# Patient Record
Sex: MALE | Race: WHITE | NOT HISPANIC OR LATINO | Employment: FULL TIME | URBAN - METROPOLITAN AREA
[De-identification: names, ages, dates, MRNs, and addresses within clinical notes are randomized per-mention and may not be internally consistent; named-entity substitution may affect disease eponyms.]

---

## 2023-08-29 ENCOUNTER — OFFICE VISIT (OUTPATIENT)
Dept: PRIMARY CARE | Facility: CLINIC | Age: 28
End: 2023-08-29
Payer: COMMERCIAL

## 2023-08-29 VITALS
SYSTOLIC BLOOD PRESSURE: 122 MMHG | HEART RATE: 67 BPM | OXYGEN SATURATION: 99 % | HEIGHT: 69 IN | DIASTOLIC BLOOD PRESSURE: 80 MMHG | WEIGHT: 171 LBS | BODY MASS INDEX: 25.33 KG/M2

## 2023-08-29 DIAGNOSIS — K29.70 GASTRITIS WITHOUT BLEEDING, UNSPECIFIED CHRONICITY, UNSPECIFIED GASTRITIS TYPE: ICD-10-CM

## 2023-08-29 DIAGNOSIS — Z00.00 HEALTH CARE MAINTENANCE: Primary | ICD-10-CM

## 2023-08-29 DIAGNOSIS — Z23 NEED FOR TETANUS BOOSTER: ICD-10-CM

## 2023-08-29 DIAGNOSIS — Z23 NEED FOR HPV VACCINATION: ICD-10-CM

## 2023-08-29 PROBLEM — K21.9 GERD (GASTROESOPHAGEAL REFLUX DISEASE): Status: ACTIVE | Noted: 2019-11-07

## 2023-08-29 PROCEDURE — 90472 IMMUNIZATION ADMIN EACH ADD: CPT | Performed by: INTERNAL MEDICINE

## 2023-08-29 PROCEDURE — 90715 TDAP VACCINE 7 YRS/> IM: CPT | Performed by: INTERNAL MEDICINE

## 2023-08-29 PROCEDURE — 1036F TOBACCO NON-USER: CPT | Performed by: INTERNAL MEDICINE

## 2023-08-29 PROCEDURE — 90651 9VHPV VACCINE 2/3 DOSE IM: CPT | Performed by: INTERNAL MEDICINE

## 2023-08-29 PROCEDURE — 99385 PREV VISIT NEW AGE 18-39: CPT | Performed by: INTERNAL MEDICINE

## 2023-08-29 PROCEDURE — 90471 IMMUNIZATION ADMIN: CPT | Performed by: INTERNAL MEDICINE

## 2023-08-29 RX ORDER — ESOMEPRAZOLE MAGNESIUM 40 MG/1
40 CAPSULE, DELAYED RELEASE ORAL
COMMUNITY
Start: 2018-08-01 | End: 2023-08-29 | Stop reason: SDUPTHER

## 2023-08-29 RX ORDER — ESOMEPRAZOLE MAGNESIUM 40 MG/1
40 CAPSULE, DELAYED RELEASE ORAL
Qty: 90 CAPSULE | Refills: 1 | Status: SHIPPED | OUTPATIENT
Start: 2023-08-29 | End: 2023-08-29 | Stop reason: SDUPTHER

## 2023-08-29 RX ORDER — ESOMEPRAZOLE MAGNESIUM 40 MG/1
40 CAPSULE, DELAYED RELEASE ORAL
Qty: 90 CAPSULE | Refills: 1 | Status: SHIPPED | OUTPATIENT
Start: 2023-08-29 | End: 2023-11-30 | Stop reason: SDUPTHER

## 2023-08-29 NOTE — ASSESSMENT & PLAN NOTE
Given durations of gastritis and unremitting without high-dose medication will refer to gastroenterology for evaluation and possible upper endoscopy.  In the meantime continue Nexium  Recommend avoiding spicy foods and carbonated beverages as well as caffeinated beverages.

## 2023-08-29 NOTE — PROGRESS NOTES
"Subjective   Patient ID: Michael Toussaint is a 27 y.o. male who presents for Annual Exam (New patient here for complete physical exam).          HPI     Patient is a 27-year-old male with past medical history of gastritis who presents to [facility] care and for wellness.  Patient recently moved from [facility] to [place].  He is a PhD in chemistry.    He been having gastritis now for greater than 10 years.  He states that when he stops the Nexium for 2 days duration his symptoms recur.  He does drink coffee but not carbonated beverages or spicy foods.  He is never seen a gastroenterologist.  His symptoms are worse in the morning.  The Nexium is effective.  No weight loss or blood in the stool.    Patient is sexually active with one partner.  He is interested in screening for STDs.  No active symptoms.    He drinks alcohol occasionally but denies illicit substances or smoking.    No other complaints at this time.    Review of Systems  Constitutional: No fever or chills, No Night Sweats  Eyes: No Blurry Vision or Eye sight problems  ENT: No Nasal Discharge, Hoarseness, sore throat  Cardiovascular: no chest pain, no palpitations and no syncope.   Respiratory: no cough, no shortness of breath during exertion and no shortness of breath at rest.   Gastrointestinal: no abdominal pain, no nausea and no vomiting.   : No issues with urinary stream, burning with urination, no blood in urine or stools  Skin: No Skin rashes or Lesions  Neuro: No Headache, no dizziness or Numbness or tingling  Psych: No Anxiety, depression or sleeping problems  Heme: No Easy bleeding or brusing.     Objective   /80   Pulse 67   Ht 1.753 m (5' 9\")   Wt 77.6 kg (171 lb)   SpO2 99%   BMI 25.25 kg/m²     Physical Exam  Constitutional: Alert and in no acute distress. Well developed, well nourished.   Head and Face: Head and face: Normal.    Eyes: Normal external exam. Pupils were equal in size, round, reactive to light " "(PERRL) with normal accommodation and extraocular movements intact (EOMI).   Ears, Nose, Mouth, and Throat: External inspection of ears and nose: Normal.  Hearing: Normal.  Nasal mucosa, septum, and turbinates: Normal.  Lips, teeth, and gums: Normal.  Oropharynx: Normal.   Neck: No neck mass was observed. Supple. Thyroid not enlarged and there were no palpable thyroid nodules.   Cardiovascular: Heart rate and rhythm were normal, normal S1 and S2. Pedal pulses: Normal. No peripheral edema.   Pulmonary: No respiratory distress. Clear bilateral breath sounds.   Abdomen: Soft nontender; no abdominal mass palpated. Normal bowel sounds. No organomegaly.   : Deferred  Musculoskeletal: No joint swelling seen, normal movements of all extremities. Range of motion: Normal.  Muscle strength/tone: Normal.    Skin: Normal skin color and pigmentation, normal skin turgor, and no rash.   Neurologic: Deep tendon reflexes were 2+ and symmetric.   Psychiatric: Judgment and insight: Intact. Mood and affect: Normal.  Lymphatic: No cervical lymphadenopathy. Palpation of lymph nodes in axillae: Normal.  Palpation of lymph nodes in groin: Normal.    No results found for: \"WBC\", \"HGB\", \"HCT\", \"PLT\", \"CHOL\", \"TRIG\", \"HDL\", \"LDLDIRECT\", \"ALT\", \"AST\", \"NA\", \"K\", \"CL\", \"CREATININE\", \"BUN\", \"CO2\", \"TSH\", \"PSA\", \"INR\", \"GLUF\", \"HGBA1C\", \"ALBUR\"    No image results found.      Assessment/Plan   Problem List Items Addressed This Visit       Gastritis without bleeding     Given durations of gastritis and unremitting without high-dose medication will refer to gastroenterology for evaluation and possible upper endoscopy.  In the meantime continue Nexium  Recommend avoiding spicy foods and carbonated beverages as well as caffeinated beverages.         Relevant Medications    esomeprazole (NexIUM) 40 mg DR capsule    Other Relevant Orders    Referral to Gastroenterology    Health care maintenance - Primary    Relevant Orders    CBC    Comprehensive " metabolic panel    Lipid Panel    HIV 1/2 Antigen/Antibody Screen with Reflex to Confirmation    Hepatitis B surface antibody    Hepatitis C Antibody    C. Trachomatis / N. Gonorrhoeae, Amplified Detection    Syphilis Screen with Reflex     Other Visit Diagnoses       Need for tetanus booster        Relevant Orders    Tdap vaccine, age 10 years and older (BOOSTRIX)    Need for HPV vaccination        Relevant Orders    HPV 9-valent vaccine (GARDASIL 9)              Dear Michael Toussaint     It was my pleasure to take care of you today in the office. Below are the things we discussed today:    1. Immunizations: Yearly Flu shot is recommended.          a: COVID: Up-to-date         b: Tetanus: Given today         c: Gardasil; start series today.  Repeat in 1 to 2 months and then in 6 months    2. Blood Work:  3. Seen your dentist twice a year  4. Yearly Eye exam is recommended    5. BMI: 25.3  6: Diet recommendations:   Eat Clean, Try to have as many home cooked meals as possible  Avoid processed foods which contain excess calories, sugar, and sodium.    7. Exercise recommendations:   150 minutes a week to maintain your weight     If you have to loose weight, you need a better diet and exercise plan.     8. Please get your Living will / Advance directive completed if you do not have one already. Please make sure our office has a copy of the latest one.     9. Colonoscopy: Started age 45  10. PSA: Started age 50    11.     Follow up in one year for a Physical. Please call the office before your Physical to see if you need blood work completed prior to your physical.     Please call me if any questions arise from now until your next visit. I will call you after I am done seeing patients. A Doctor is always available by phone when the office is closed. Please feel free to call for help with any problem that you feel shouldn't wait until the office re-opens.     Todd Mason, DO

## 2023-08-30 LAB
ALANINE AMINOTRANSFERASE (SGPT) (U/L) IN SER/PLAS: 36 U/L (ref 10–52)
ALBUMIN (G/DL) IN SER/PLAS: 4.6 G/DL (ref 3.4–5)
ALKALINE PHOSPHATASE (U/L) IN SER/PLAS: 59 U/L (ref 33–120)
ANION GAP IN SER/PLAS: 12 MMOL/L (ref 10–20)
ASPARTATE AMINOTRANSFERASE (SGOT) (U/L) IN SER/PLAS: 24 U/L (ref 9–39)
BILIRUBIN TOTAL (MG/DL) IN SER/PLAS: 0.6 MG/DL (ref 0–1.2)
CALCIUM (MG/DL) IN SER/PLAS: 10.1 MG/DL (ref 8.6–10.6)
CARBON DIOXIDE, TOTAL (MMOL/L) IN SER/PLAS: 30 MMOL/L (ref 21–32)
CHLORIDE (MMOL/L) IN SER/PLAS: 101 MMOL/L (ref 98–107)
CHOLESTEROL (MG/DL) IN SER/PLAS: 165 MG/DL (ref 0–199)
CHOLESTEROL IN HDL (MG/DL) IN SER/PLAS: 38.4 MG/DL
CHOLESTEROL/HDL RATIO: 4.3
CREATININE (MG/DL) IN SER/PLAS: 1.03 MG/DL (ref 0.5–1.3)
ERYTHROCYTE DISTRIBUTION WIDTH (RATIO) BY AUTOMATED COUNT: 12.2 % (ref 11.5–14.5)
ERYTHROCYTE MEAN CORPUSCULAR HEMOGLOBIN CONCENTRATION (G/DL) BY AUTOMATED: 32.6 G/DL (ref 32–36)
ERYTHROCYTE MEAN CORPUSCULAR VOLUME (FL) BY AUTOMATED COUNT: 89 FL (ref 80–100)
ERYTHROCYTES (10*6/UL) IN BLOOD BY AUTOMATED COUNT: 5.06 X10E12/L (ref 4.5–5.9)
GFR MALE: >90 ML/MIN/1.73M2
GLUCOSE (MG/DL) IN SER/PLAS: 91 MG/DL (ref 74–99)
HEMATOCRIT (%) IN BLOOD BY AUTOMATED COUNT: 45.1 % (ref 41–52)
HEMOGLOBIN (G/DL) IN BLOOD: 14.7 G/DL (ref 13.5–17.5)
HEPATITIS B VIRUS SURFACE AB (MIU/ML) IN SERUM: 5.8 MIU/ML
HEPATITIS C VIRUS AB PRESENCE IN SERUM: NONREACTIVE
HIV 1/ 2 AG/AB SCREEN: NONREACTIVE
LDL: 116 MG/DL (ref 0–99)
LEUKOCYTES (10*3/UL) IN BLOOD BY AUTOMATED COUNT: 6.5 X10E9/L (ref 4.4–11.3)
NRBC (PER 100 WBCS) BY AUTOMATED COUNT: 0 /100 WBC (ref 0–0)
PLATELETS (10*3/UL) IN BLOOD AUTOMATED COUNT: 237 X10E9/L (ref 150–450)
POTASSIUM (MMOL/L) IN SER/PLAS: 4.3 MMOL/L (ref 3.5–5.3)
PROTEIN TOTAL: 7.2 G/DL (ref 6.4–8.2)
SODIUM (MMOL/L) IN SER/PLAS: 139 MMOL/L (ref 136–145)
SYPHILIS TOTAL AB: NONREACTIVE
TRIGLYCERIDE (MG/DL) IN SER/PLAS: 54 MG/DL (ref 0–149)
UREA NITROGEN (MG/DL) IN SER/PLAS: 13 MG/DL (ref 6–23)
VLDL: 11 MG/DL (ref 0–40)

## 2023-08-30 PROCEDURE — 87389 HIV-1 AG W/HIV-1&-2 AB AG IA: CPT

## 2023-08-30 PROCEDURE — 87591 N.GONORRHOEAE DNA AMP PROB: CPT

## 2023-08-30 PROCEDURE — 87491 CHLMYD TRACH DNA AMP PROBE: CPT

## 2023-08-30 PROCEDURE — 80053 COMPREHEN METABOLIC PANEL: CPT

## 2023-08-30 PROCEDURE — 86706 HEP B SURFACE ANTIBODY: CPT

## 2023-08-30 PROCEDURE — 85027 COMPLETE CBC AUTOMATED: CPT

## 2023-08-30 PROCEDURE — 86803 HEPATITIS C AB TEST: CPT

## 2023-08-30 PROCEDURE — 86780 TREPONEMA PALLIDUM: CPT

## 2023-08-30 PROCEDURE — 80061 LIPID PANEL: CPT

## 2023-08-31 LAB
CHLAMYDIA TRACH., AMPLIFIED: NEGATIVE
N. GONORRHEA, AMPLIFIED: NEGATIVE

## 2023-11-29 NOTE — PROGRESS NOTES
PCP: No primary care provider on file.    Referring provider: No ref. provider found    Michael Toussaint is a 28 y.o. male with PMH of GERD presenting for evaluation.  History Of Present Illness:  Patient is feeling well today. He states that he was diagnosed with GERD at age 17. He had a normal EGD in 2013, but he is unsure if biopsies were performed. His symptoms are mostly controlled on esomeprazole, which he has taken consistently for the past 5 years. He states that if he misses a dose, his symptoms recur almost immediately. He states that symptoms are exacerbated by large carbohydrate loads, such as pancakes and pasta. His symptoms are also exacerbated by eating late at night. He does endorse globus sensation, but denies aspiration or choking. He denies personal or family history of allergic or atopic diseases. He denies NSAID use.    He additionally denies syncope/LOC, chest pain, dyspnea, cough, abdominal pain, dysphagia, nausea, vomiting, constipation, melena, hematochezia, dysuria, hematuria, lower extremity swelling, rash, fevers, chills, sick contacts, weight loss, or travel.     12-point ROS was reviewed and is otherwise negative.    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    Home Medications:  Current Outpatient Medications   Medication Instructions    esomeprazole (NEXIUM) 40 mg, oral, Daily before breakfast        Surgical History:  He has no past surgical history on file.     Social History:  He reports that he has never smoked. He has never used smokeless tobacco. He reports current alcohol use of about 2.0 standard drinks of alcohol per week. He reports that he does not use drugs.    Family History:  No family history on file.    "  Allergies:  Sulfa (sulfonamide antibiotics) and Amoxicillin    OBJECTIVE:  Vital Signs:  Blood pressure 128/72, pulse 77, temperature 36.6 °C (97.8 °F), resp. rate 17, height 1.753 m (5' 9\"), weight 75.3 kg (165 lb 14.4 oz), SpO2 97 %.    Physical Exam:  General: Patient is in NAD.  Neuro: A and O x 3, CN 1-12 grossly intact, no asterixis.  HEENT: moist mucosa, sclera anicteric, EOMI  CV: regular rate  Pulm: nonlabored breathing on room air  Abd: Soft, nontender/ nondistended, +BS, no rebound or guarding, no hepatosplenomegaly.  Ext: Warm and well-perfused.  Psych: Appropriate mood and behavior.    Labs:  Lab Results   Component Value Date    WBC 6.5 08/30/2023    HGB 14.7 08/30/2023    HCT 45.1 08/30/2023    MCV 89 08/30/2023     08/30/2023     Lab Results   Component Value Date    GLUCOSE 91 08/30/2023    CALCIUM 10.1 08/30/2023     08/30/2023    K 4.3 08/30/2023    CO2 30 08/30/2023     08/30/2023    BUN 13 08/30/2023    CREATININE 1.03 08/30/2023     Lab Results   Component Value Date    ALT 36 08/30/2023    AST 24 08/30/2023    ALKPHOS 59 08/30/2023    BILITOT 0.6 08/30/2023       Assessment/Plan   Patient is a 27 yo M with longstanding GERD who presents for evaluation. Symptoms are generally controlled on PPI, but patient does have intermittent breakthrough. He does not recall having biopsies with prior endoscopic evaluation. Ddx is GERD v EoE. Celiac and HP are possible but less likely.    Plan:  -EGD ordered today  -Continue PPI 40mg once daily. If symptoms worsen, can increase to twice daily.    RTC in 6 months, pending endoscopy results .    Case seen, examined and discussed with attending, Dr. Grady.   "

## 2023-11-30 ENCOUNTER — OFFICE VISIT (OUTPATIENT)
Dept: GASTROENTEROLOGY | Facility: HOSPITAL | Age: 28
End: 2023-11-30
Payer: COMMERCIAL

## 2023-11-30 VITALS
DIASTOLIC BLOOD PRESSURE: 72 MMHG | SYSTOLIC BLOOD PRESSURE: 128 MMHG | RESPIRATION RATE: 17 BRPM | HEIGHT: 69 IN | TEMPERATURE: 97.8 F | WEIGHT: 165.9 LBS | OXYGEN SATURATION: 97 % | BODY MASS INDEX: 24.57 KG/M2 | HEART RATE: 77 BPM

## 2023-11-30 DIAGNOSIS — K29.70 GASTRITIS WITHOUT BLEEDING, UNSPECIFIED CHRONICITY, UNSPECIFIED GASTRITIS TYPE: Primary | ICD-10-CM

## 2023-11-30 PROCEDURE — 99214 OFFICE O/P EST MOD 30 MIN: CPT | Mod: GC | Performed by: STUDENT IN AN ORGANIZED HEALTH CARE EDUCATION/TRAINING PROGRAM

## 2023-11-30 PROCEDURE — 99204 OFFICE O/P NEW MOD 45 MIN: CPT | Performed by: STUDENT IN AN ORGANIZED HEALTH CARE EDUCATION/TRAINING PROGRAM

## 2023-11-30 PROCEDURE — 1036F TOBACCO NON-USER: CPT | Performed by: STUDENT IN AN ORGANIZED HEALTH CARE EDUCATION/TRAINING PROGRAM

## 2023-11-30 RX ORDER — ESOMEPRAZOLE MAGNESIUM 40 MG/1
40 CAPSULE, DELAYED RELEASE ORAL
Qty: 90 CAPSULE | Refills: 3 | Status: SHIPPED | OUTPATIENT
Start: 2023-11-30 | End: 2024-03-13

## 2023-11-30 SDOH — ECONOMIC STABILITY: FOOD INSECURITY: WITHIN THE PAST 12 MONTHS, THE FOOD YOU BOUGHT JUST DIDN'T LAST AND YOU DIDN'T HAVE MONEY TO GET MORE.: NEVER TRUE

## 2023-11-30 SDOH — ECONOMIC STABILITY: FOOD INSECURITY: WITHIN THE PAST 12 MONTHS, YOU WORRIED THAT YOUR FOOD WOULD RUN OUT BEFORE YOU GOT MONEY TO BUY MORE.: NEVER TRUE

## 2023-11-30 ASSESSMENT — PATIENT HEALTH QUESTIONNAIRE - PHQ9
2. FEELING DOWN, DEPRESSED OR HOPELESS: NOT AT ALL
SUM OF ALL RESPONSES TO PHQ9 QUESTIONS 1 AND 2: 0
1. LITTLE INTEREST OR PLEASURE IN DOING THINGS: NOT AT ALL

## 2023-11-30 ASSESSMENT — LIFESTYLE VARIABLES
HOW OFTEN DO YOU HAVE SIX OR MORE DRINKS ON ONE OCCASION: NEVER
HOW MANY STANDARD DRINKS CONTAINING ALCOHOL DO YOU HAVE ON A TYPICAL DAY: 1 OR 2
HOW OFTEN DO YOU HAVE A DRINK CONTAINING ALCOHOL: 2-4 TIMES A MONTH
SKIP TO QUESTIONS 9-10: 1
AUDIT-C TOTAL SCORE: 2

## 2023-11-30 ASSESSMENT — PAIN SCALES - GENERAL: PAINLEVEL: 0-NO PAIN

## 2023-11-30 NOTE — PATIENT INSTRUCTIONS
Dear  Breana    It was a pleasure meeting you in clinic today.    Schedule your endoscopy either at main campus or at [facility] (you will need a  for the procedure).  Before the procedure, you will need labs.  I refilled your nexium.    Based on the results of your endoscopy, we can decide if you should follow up in clinic.    Best regards,    Fidelia Caraballo MD  Division of Gastroenterology and Liver Disease  [address] [place] [address] [address] [place] [id number]  Appt. Phone   Inquiries   Fax  or

## 2023-11-30 NOTE — LETTER
December 11, 2023     Todd Mason DO  [address]  [address]  [facility] [address]    Patient: Michael Toussaint   YOB: 1995   Date of Visit: 11/30/2023       Dear Dr. Todd Mason DO:    Thank you for referring Michael Toussaint to me for evaluation. Below are my notes for this consultation.  If you have questions, please do not hesitate to call me. I look forward to following your patient along with you.       Sincerely,     Fidelia Caraballo MD      CC: No Recipients  ______________________________________________________________________________________    PCP: No primary care provider on file.    Referring provider: No ref. provider found    Michael Toussaint is a 28 y.o. male with PMH of GERD presenting for evaluation.  History Of Present Illness:  Patient is feeling well today. He states that he was diagnosed with GERD at age 17. He had a normal EGD in 2013, but he is unsure if biopsies were performed. His symptoms are mostly controlled on esomeprazole, which he has taken consistently for the past 5 years. He states that if he misses a dose, his symptoms recur almost immediately. He states that symptoms are exacerbated by large carbohydrate loads, such as pancakes and pasta. His symptoms are also exacerbated by eating late at night. He does endorse globus sensation, but denies aspiration or choking. He denies personal or family history of allergic or atopic diseases. He denies NSAID use.    He additionally denies syncope/LOC, chest pain, dyspnea, cough, abdominal pain, dysphagia, nausea, vomiting, constipation, melena, hematochezia, dysuria, hematuria, lower extremity swelling, rash, fevers, chills, sick contacts, weight loss, or travel.     12-point ROS was reviewed and is otherwise negative.    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    Home Medications:  Current Outpatient Medications   Medication Instructions   • esomeprazole (NEXIUM) 40 mg, oral, Daily before breakfast        Surgical History:  He has no past surgical history on file.     Social History:  He reports that he has never smoked. He has never used smokeless tobacco. He reports current alcohol use of about 2.0 standard drinks of alcohol per week. He reports that he does not use drugs.    Family History:  No family history on file.     Allergies:  Sulfa (sulfonamide antibiotics) and Amoxicillin    OBJECTIVE:  Vital Signs:  Blood pressure 128/72, pulse 77, temperature 36.6 °C (97.8 °F), resp. rate 17, height 1.753 m (5' 9\"), weight 75.3 kg (165 lb 14.4 oz), SpO2 97 %.    Physical Exam:  General: Patient is in NAD.  Neuro: A and O x 3, CN 1-12 grossly intact, no asterixis.  HEENT: moist mucosa, sclera anicteric, EOMI  CV: regular rate  Pulm: nonlabored breathing on room air  Abd: Soft, nontender/ nondistended, +BS, no rebound or guarding, no hepatosplenomegaly.  Ext: Warm and well-perfused.  Psych: Appropriate mood and behavior.    Labs:  Lab Results   Component Value Date    WBC 6.5 08/30/2023    HGB 14.7 08/30/2023    HCT 45.1 08/30/2023    MCV 89 08/30/2023     08/30/2023     Lab Results   Component Value Date    GLUCOSE 91 08/30/2023    CALCIUM 10.1 08/30/2023     08/30/2023    K 4.3 08/30/2023    CO2 30 08/30/2023     08/30/2023    BUN 13 08/30/2023    CREATININE 1.03 08/30/2023     Lab Results   Component Value Date    ALT 36 08/30/2023    AST 24 08/30/2023    ALKPHOS 59 08/30/2023    BILITOT 0.6 08/30/2023       Assessment/Plan  Patient is a 29 yo M with longstanding GERD who presents for evaluation. Symptoms are generally controlled on PPI, but patient does have intermittent breakthrough. He does not recall having biopsies with prior " endoscopic evaluation. Ddx is GERD v EoE. Celiac and HP are possible but less likely.    Plan:  -EGD ordered today  -Continue PPI 40mg once daily. If symptoms worsen, can increase to twice daily.    RTC in 6 months, pending endoscopy results .    Case seen, examined and discussed with attending, Dr. Grady.     Attestation signed by James Grady MD PhD at 11/30/2023  3:27 PM:  I saw and evaluated the patient. I personally obtained the key and critical portions of the history and physical exam or was physically present for key and critical portions performed by the trainee. I agree with the trainee's medical decision making as described in the trainee's note.    James Grady MD

## 2024-01-11 NOTE — PROGRESS NOTES
I saw and evaluated the patient. I personally obtained the key and critical portions of the history and physical exam or was physically present for key and critical portions performed by the trainee. I agree with the trainee's medical decision making as described in the trainee's note.    MD James Dejesus MD PhD

## 2024-02-01 ENCOUNTER — ANESTHESIA EVENT (OUTPATIENT)
Dept: GASTROENTEROLOGY | Facility: HOSPITAL | Age: 29
End: 2024-02-01
Payer: COMMERCIAL

## 2024-02-01 NOTE — ANESTHESIA PREPROCEDURE EVALUATION
"Patient: Michael Toussaint    Procedure Information       Date/Time: 02/02/24 0730    Scheduled providers: Tessie Santos MD    Procedure: EGD    [place] [facility] [place]            Relevant Problems   GI   (+) GERD (gastroesophageal reflux disease)       Clinical information reviewed:   Tobacco  Allergies  Meds   Med Hx  Surg Hx   Fam Hx  Soc Hx        NPO/Void Status  Date of Last Liquid: 02/02/24  Time of Last Liquid: 0000  Date of Last Solid: 02/01/24  Time of Last Solid: 2100           Past Medical History:   Diagnosis Date    GERD (gastroesophageal reflux disease)       Past Surgical History:   Procedure Laterality Date    ESOPHAGOGASTRODUODENOSCOPY      WISDOM TOOTH EXTRACTION       Social History     Tobacco Use    Smoking status: Never    Smokeless tobacco: Never   Vaping Use    Vaping Use: Never used   Substance Use Topics    Alcohol use: Yes     Alcohol/week: 2.0 standard drinks of alcohol     Types: 2 Cans of beer per week    Drug use: Never      Current Outpatient Medications   Medication Instructions    esomeprazole (NEXIUM) 40 mg, oral, Daily before breakfast      Allergies   Allergen Reactions    Sulfa (Sulfonamide Antibiotics) Other    Amoxicillin Itching and Rash        Chemistry    Lab Results   Component Value Date/Time     08/30/2023 0902    K 4.3 08/30/2023 0902     08/30/2023 0902    CO2 30 08/30/2023 0902    BUN 13 08/30/2023 0902    CREATININE 1.03 08/30/2023 0902    Lab Results   Component Value Date/Time    CALCIUM 10.1 08/30/2023 0902    ALKPHOS 59 08/30/2023 0902    AST 24 08/30/2023 0902    ALT 36 08/30/2023 0902    BILITOT 0.6 08/30/2023 0902          Lab Results   Component Value Date/Time    WBC 6.5 08/30/2023 0902    HGB 14.7 08/30/2023 0902    HCT 45.1 08/30/2023 0902     08/30/2023 0902     No results found for: \"PROTIME\", \"PTT\", \"INR\"  No results found for this or any previous visit (from the past 4464 hour(s)).  No results found for this " "or any previous visit from the past 1095 days.       Visit Vitals  /82   Pulse 62   Temp 36.2 °C (97.2 °F) (Temporal)   Resp 16   Ht 1.727 m (5' 8\")   Wt 77 kg (169 lb 12.1 oz)   SpO2 99%   BMI 25.81 kg/m²   Smoking Status Never   BSA 1.92 m²        Physical Exam    Airway  Mallampati: III  TM distance: >3 FB  Neck ROM: full     Cardiovascular   Rhythm: regular  Rate: normal     Dental - normal exam     Pulmonary   Breath sounds clear to auscultation     Abdominal - normal exam              Anesthesia Plan    History of general anesthesia?: yes  History of complications of general anesthesia?: no    ASA 2     MAC     intravenous induction   Anesthetic plan and risks discussed with patient.    Plan discussed with CRNA and CAA.        "

## 2024-02-02 ENCOUNTER — ANESTHESIA (OUTPATIENT)
Dept: GASTROENTEROLOGY | Facility: HOSPITAL | Age: 29
End: 2024-02-02
Payer: COMMERCIAL

## 2024-02-02 ENCOUNTER — HOSPITAL ENCOUNTER (OUTPATIENT)
Dept: GASTROENTEROLOGY | Facility: HOSPITAL | Age: 29
Setting detail: OUTPATIENT SURGERY
Discharge: HOME | End: 2024-02-02
Payer: COMMERCIAL

## 2024-02-02 VITALS
DIASTOLIC BLOOD PRESSURE: 78 MMHG | SYSTOLIC BLOOD PRESSURE: 131 MMHG | OXYGEN SATURATION: 99 % | HEART RATE: 80 BPM | TEMPERATURE: 99.3 F | RESPIRATION RATE: 18 BRPM | BODY MASS INDEX: 25.73 KG/M2 | WEIGHT: 169.75 LBS | HEIGHT: 68 IN

## 2024-02-02 DIAGNOSIS — K29.70 GASTRITIS WITHOUT BLEEDING, UNSPECIFIED CHRONICITY, UNSPECIFIED GASTRITIS TYPE: ICD-10-CM

## 2024-02-02 PROCEDURE — 43235 EGD DIAGNOSTIC BRUSH WASH: CPT | Performed by: INTERNAL MEDICINE

## 2024-02-02 PROCEDURE — 7100000009 HC PHASE TWO TIME - INITIAL BASE CHARGE

## 2024-02-02 PROCEDURE — 7100000010 HC PHASE TWO TIME - EACH INCREMENTAL 1 MINUTE

## 2024-02-02 PROCEDURE — A43239 PR EDG TRANSORAL BIOPSY SINGLE/MULTIPLE: Performed by: NURSE ANESTHETIST, CERTIFIED REGISTERED

## 2024-02-02 PROCEDURE — A43239 PR EDG TRANSORAL BIOPSY SINGLE/MULTIPLE: Performed by: ANESTHESIOLOGY

## 2024-02-02 PROCEDURE — 3700000002 HC GENERAL ANESTHESIA TIME - EACH INCREMENTAL 1 MINUTE

## 2024-02-02 PROCEDURE — 3700000001 HC GENERAL ANESTHESIA TIME - INITIAL BASE CHARGE

## 2024-02-02 PROCEDURE — 2500000004 HC RX 250 GENERAL PHARMACY W/ HCPCS (ALT 636 FOR OP/ED): Performed by: NURSE ANESTHETIST, CERTIFIED REGISTERED

## 2024-02-02 RX ORDER — SODIUM CHLORIDE, SODIUM LACTATE, POTASSIUM CHLORIDE, CALCIUM CHLORIDE 600; 310; 30; 20 MG/100ML; MG/100ML; MG/100ML; MG/100ML
INJECTION, SOLUTION INTRAVENOUS CONTINUOUS PRN
Status: DISCONTINUED | OUTPATIENT
Start: 2024-02-02 | End: 2024-02-02

## 2024-02-02 RX ORDER — PROPOFOL 10 MG/ML
INJECTION, EMULSION INTRAVENOUS AS NEEDED
Status: DISCONTINUED | OUTPATIENT
Start: 2024-02-02 | End: 2024-02-02

## 2024-02-02 RX ORDER — MIDAZOLAM HYDROCHLORIDE 1 MG/ML
INJECTION INTRAMUSCULAR; INTRAVENOUS AS NEEDED
Status: DISCONTINUED | OUTPATIENT
Start: 2024-02-02 | End: 2024-02-02

## 2024-02-02 RX ADMIN — MIDAZOLAM HYDROCHLORIDE 2 MG: 1 INJECTION INTRAMUSCULAR; INTRAVENOUS at 07:44

## 2024-02-02 RX ADMIN — PROPOFOL 50 MG: 10 INJECTION, EMULSION INTRAVENOUS at 07:46

## 2024-02-02 RX ADMIN — PROPOFOL 50 MG: 10 INJECTION, EMULSION INTRAVENOUS at 07:45

## 2024-02-02 RX ADMIN — PROPOFOL 20 MG: 10 INJECTION, EMULSION INTRAVENOUS at 07:44

## 2024-02-02 RX ADMIN — SODIUM CHLORIDE, SODIUM LACTATE, POTASSIUM CHLORIDE, AND CALCIUM CHLORIDE: .6; .31; .03; .02 INJECTION, SOLUTION INTRAVENOUS at 07:33

## 2024-02-02 RX ADMIN — PROPOFOL 30 MG: 10 INJECTION, EMULSION INTRAVENOUS at 07:47

## 2024-02-02 ASSESSMENT — PAIN SCALES - GENERAL
PAINLEVEL_OUTOF10: 0 - NO PAIN

## 2024-02-02 ASSESSMENT — COLUMBIA-SUICIDE SEVERITY RATING SCALE - C-SSRS
6. HAVE YOU EVER DONE ANYTHING, STARTED TO DO ANYTHING, OR PREPARED TO DO ANYTHING TO END YOUR LIFE?: NO
2. HAVE YOU ACTUALLY HAD ANY THOUGHTS OF KILLING YOURSELF?: NO
1. IN THE PAST MONTH, HAVE YOU WISHED YOU WERE DEAD OR WISHED YOU COULD GO TO SLEEP AND NOT WAKE UP?: NO

## 2024-02-02 ASSESSMENT — PAIN - FUNCTIONAL ASSESSMENT
PAIN_FUNCTIONAL_ASSESSMENT: 0-10

## 2024-02-02 NOTE — SIGNIFICANT EVENT
Assumed care of this pt at this time.  Pt is awake, eating and drinking without nausea or vomiting

## 2024-02-02 NOTE — H&P
"History Of Present Illness  Michael Toussaint is a 28 y.o. male presenting with gerd.     Past Medical History  Past Medical History:   Diagnosis Date    GERD (gastroesophageal reflux disease)      Surgical History  Past Surgical History:   Procedure Laterality Date    ESOPHAGOGASTRODUODENOSCOPY      WISDOM TOOTH EXTRACTION       Social History  He reports that he has never smoked. He has never used smokeless tobacco. He reports current alcohol use of about 2.0 standard drinks of alcohol per week. He reports that he does not use drugs.    Family History  No family history on file.     Allergies  Allergies   Allergen Reactions    Sulfa (Sulfonamide Antibiotics) Other    Amoxicillin Itching and Rash     Review of Systems     Physical Exam     Last Recorded Vitals  Blood pressure 128/82, pulse 62, temperature 36.2 °C (97.2 °F), temperature source Temporal, resp. rate 16, height 1.727 m (5' 8\"), weight 77 kg (169 lb 12.1 oz), SpO2 99 %.    Assessment/Plan   egd     Tessie Santos MD  "

## 2024-02-02 NOTE — ANESTHESIA POSTPROCEDURE EVALUATION
Patient: Michael Toussaint    Procedure Summary       Date: 02/02/24 Room / Location: [facility]    Anesthesia Start: 0740 Anesthesia Stop: 0753    Procedure: EGD Diagnosis: Gastritis without bleeding, unspecified chronicity, unspecified gastritis type    Scheduled Providers: Tessie Santos MD; Fuentes Seymour MD; JESUS Casillas; Eboni Lundberg, SYDNEY; Marcell Holley, RN; Ashlyn Matos RN Responsible Provider: Fuentes Seymour MD    Anesthesia Type: MAC ASA Status: 2            Anesthesia Type: MAC    Vitals Value Taken Time   /78 02/02/24 0830   Temp 37.4 °C (99.3 °F) 02/02/24 0755   Pulse 80 02/02/24 0830   Resp 18 02/02/24 0830   SpO2 99 % 02/02/24 0830       Anesthesia Post Evaluation    Patient location during evaluation: PACU  Patient participation: complete - patient participated  Level of consciousness: awake and alert  Pain management: adequate  Airway patency: patent  Cardiovascular status: acceptable and hemodynamically stable  Respiratory status: acceptable, spontaneous ventilation and nonlabored ventilation  Hydration status: acceptable  Postoperative Nausea and Vomiting: none        There were no known notable events for this encounter.

## 2024-02-05 ASSESSMENT — PAIN SCALES - GENERAL: PAINLEVEL_OUTOF10: 0 - NO PAIN

## 2024-02-05 NOTE — ADDENDUM NOTE
Encounter addended by: Michelle Lama RN on: 2/5/2024 8:38 AM   Actions taken: Contacts section saved, Flowsheet accepted

## 2024-03-13 RX ORDER — ESOMEPRAZOLE MAGNESIUM 40 MG/1
40 CAPSULE, DELAYED RELEASE ORAL
Qty: 30 CAPSULE | Refills: 11 | Status: SHIPPED | OUTPATIENT
Start: 2024-03-13 | End: 2025-03-13

## 2024-03-18 ENCOUNTER — OFFICE VISIT (OUTPATIENT)
Dept: PRIMARY CARE | Facility: CLINIC | Age: 29
End: 2024-03-18
Payer: COMMERCIAL

## 2024-03-18 ENCOUNTER — LAB (OUTPATIENT)
Dept: LAB | Facility: LAB | Age: 29
End: 2024-03-18
Payer: COMMERCIAL

## 2024-03-18 VITALS
OXYGEN SATURATION: 99 % | DIASTOLIC BLOOD PRESSURE: 74 MMHG | HEART RATE: 64 BPM | BODY MASS INDEX: 26 KG/M2 | WEIGHT: 171 LBS | SYSTOLIC BLOOD PRESSURE: 118 MMHG

## 2024-03-18 DIAGNOSIS — Z11.3 SCREEN FOR STD (SEXUALLY TRANSMITTED DISEASE): ICD-10-CM

## 2024-03-18 DIAGNOSIS — Z11.3 SCREEN FOR STD (SEXUALLY TRANSMITTED DISEASE): Primary | ICD-10-CM

## 2024-03-18 LAB
HBV CORE AB SER QL: NONREACTIVE
HCV AB SER QL: NONREACTIVE
HIV 1+2 AB+HIV1 P24 AG SERPL QL IA: NONREACTIVE
TREPONEMA PALLIDUM IGG+IGM AB [PRESENCE] IN SERUM OR PLASMA BY IMMUNOASSAY: NONREACTIVE

## 2024-03-18 PROCEDURE — 36415 COLL VENOUS BLD VENIPUNCTURE: CPT

## 2024-03-18 PROCEDURE — 99213 OFFICE O/P EST LOW 20 MIN: CPT | Performed by: INTERNAL MEDICINE

## 2024-03-18 PROCEDURE — 87800 DETECT AGNT MULT DNA DIREC: CPT

## 2024-03-18 PROCEDURE — 1036F TOBACCO NON-USER: CPT | Performed by: INTERNAL MEDICINE

## 2024-03-18 PROCEDURE — 86704 HEP B CORE ANTIBODY TOTAL: CPT

## 2024-03-18 PROCEDURE — 87389 HIV-1 AG W/HIV-1&-2 AB AG IA: CPT

## 2024-03-18 PROCEDURE — 86780 TREPONEMA PALLIDUM: CPT

## 2024-03-18 PROCEDURE — 86803 HEPATITIS C AB TEST: CPT

## 2024-03-18 NOTE — PROGRESS NOTES
Subjective   Patient ID: Michael Toussaint is a 28 y.o. male who presents for Follow-up and Exposure to STD.    HPI   Patient is a 28-year-old male with no significant past medical history who presents for STD screening.  He states he is with a new partner and wants to be clear of all infections or to ensure that he is clear of all infections.  No discharge.  No concern for STDs.    Review of Systems  Constitutional: No fever or chills  Cardiovascular: no chest pain, no palpitations and no syncope.   Respiratory: no cough, no shortness of breath during exertion and no shortness of breath at rest.   Gastrointestinal: no abdominal pain, no nausea and no vomiting.  Neuro: No Headache, no dizziness    Objective   /74   Pulse 64   Wt 77.6 kg (171 lb)   SpO2 99%   BMI 26.00 kg/m²     Physical Exam  Constitutional: Alert and in no acute distress. Well developed, well nourished  Head and Face: Head and face: Normal.    Cardiovascular: Heart rate and rhythm were normal, normal S1 and S2. No peripheral edema.   Pulmonary: No respiratory distress. Clear bilateral breath sounds.  Musculoskeletal: Gait and station: Normal. Muscle strength/tone: Normal.   Skin: Normal skin color and pigmentation, normal skin turgor, and no rash.    Psychiatric: Judgment and insight: Intact. Mood and affect: Normal.    Procedures    Lab Results   Component Value Date    WBC 6.5 08/30/2023    HGB 14.7 08/30/2023    HCT 45.1 08/30/2023     08/30/2023    CHOL 165 08/30/2023    TRIG 54 08/30/2023    HDL 38.4 (A) 08/30/2023    ALT 36 08/30/2023    AST 24 08/30/2023     08/30/2023    K 4.3 08/30/2023     08/30/2023    CREATININE 1.03 08/30/2023    BUN 13 08/30/2023    CO2 30 08/30/2023       EGD  Table formatting from the original result was not included.  Impression  The Z-line appeared normal.  2 cm type I hiatal hernia  The stomach and duodenum appeared normal.    Findings  The Z-line appeared normal. Z-line is 40  cm from the incisors.  2 cm sliding hiatal hernia (type I hiatal hernia)  The stomach and duodenum appeared normal.    Recommendation   Follow up with PCP    Continue acid suppression for symptom control       Indication  Gastritis without bleeding, unspecified chronicity, unspecified gastritis   type    Staff  Staff Role   Tessie Santos MD Proceduralist     Medications  See Anesthesia Record.     Preprocedure  A history and physical has been performed, and patient medication   allergies have been reviewed. The patient's tolerance of previous   anesthesia has been reviewed. The risks and benefits of the procedure and   the sedation options and risks were discussed with the patient. All   questions were answered and informed consent obtained.    Details of the Procedure  The patient underwent monitored anesthesia care, which was administered by   an anesthesia professional. The patient's blood pressure, ECG, ETCO2,   heart rate, level of consciousness, oxygen and respirations were monitored   throughout the procedure. The scope was introduced through the mouth and   advanced to the second part of the duodenum. Retroflexion was performed in   the cardia. The patient experienced no blood loss. The procedure was not   difficult. The patient tolerated the procedure well. There were no   apparent adverse events.     Events  Procedure Events   Event Event Time   ENDO SCOPE IN TIME 2/2/2024  7:45 AM   ENDO SCOPE OUT TIME 2/2/2024  7:47 AM     Specimens  No specimens collected    Procedure Location  [place] [id number] [facility] [address] [phone]    Referring Provider  Fidelia Caraballo MD  [address] [place] [address]    Procedure Provider  Tessie Santos MD            Assessment/Plan   Problem List Items Addressed This Visit    None  Visit Diagnoses         Codes    Screen for STD (sexually transmitted disease)    -  Primary Z11.3    Relevant Orders    HIV 1/2  Antigen/Antibody Screen with Reflex to Confirmation    Hepatitis C Antibody    Hepatitis B core antibody, total    C. trachomatis / N. gonorrhoeae, DNA probe    Syphilis Screen with Reflex

## 2024-03-19 LAB
C TRACH RRNA SPEC QL NAA+PROBE: NEGATIVE
N GONORRHOEA DNA SPEC QL PROBE+SIG AMP: NEGATIVE

## 2024-10-22 ENCOUNTER — OFFICE VISIT (OUTPATIENT)
Dept: URGENT CARE | Age: 29
End: 2024-10-22
Payer: COMMERCIAL

## 2024-10-22 VITALS
HEART RATE: 54 BPM | RESPIRATION RATE: 16 BRPM | SYSTOLIC BLOOD PRESSURE: 107 MMHG | TEMPERATURE: 98.6 F | OXYGEN SATURATION: 98 % | DIASTOLIC BLOOD PRESSURE: 66 MMHG

## 2024-10-22 DIAGNOSIS — Z53.21 PATIENT LEFT WITHOUT BEING SEEN: Primary | ICD-10-CM

## 2024-10-22 NOTE — PROGRESS NOTES
Subjective   Patient ID: Michael Toussaint is a 29 y.o. male. They present today with a chief complaint of Exposure to STD (Pt requesting std testing , No sx ).    History of Present Illness  HPI    Past Medical History  Allergies as of 10/22/2024 - Reviewed 10/22/2024   Allergen Reaction Noted    Sulfa (sulfonamide antibiotics) Other 10/08/2018    Amoxicillin Itching and Rash 11/01/2014       (Not in a hospital admission)       Past Medical History:   Diagnosis Date    GERD (gastroesophageal reflux disease)        Past Surgical History:   Procedure Laterality Date    ESOPHAGOGASTRODUODENOSCOPY      WISDOM TOOTH EXTRACTION          reports that he has never smoked. He has never used smokeless tobacco. He reports current alcohol use of about 2.0 standard drinks of alcohol per week. He reports that he does not use drugs.    Review of Systems  Review of Systems                               Objective    Vitals:    10/22/24 1521   BP: 107/66   BP Location: Left arm   Patient Position: Sitting   Pulse: 54   Resp: 16   Temp: 37 °C (98.6 °F)   SpO2: 98%     No LMP for male patient.    Physical Exam    Procedures    Point of Care Test & Imaging Results from this visit  No results found for this visit on 10/22/24.   No results found.    Diagnostic study results (if any) were reviewed by Frieda Ramirez.    Assessment/Plan   Allergies, medications, history, and pertinent labs/EKGs/Imaging reviewed by Frieda Ramirez.     Medical Decision Making  Patient denies having any symptoms, opted to see PCP for routine health screening.    Orders and Diagnoses  Diagnoses and all orders for this visit:  Patient left without being seen      Medical Admin Record      Patient disposition: Home    Electronically signed by Frieda Ramirez  4:14 PM

## 2024-11-19 ENCOUNTER — APPOINTMENT (OUTPATIENT)
Dept: PRIMARY CARE | Facility: CLINIC | Age: 29
End: 2024-11-19

## 2025-01-17 DIAGNOSIS — K29.70 GASTRITIS WITHOUT BLEEDING, UNSPECIFIED CHRONICITY, UNSPECIFIED GASTRITIS TYPE: ICD-10-CM

## 2025-01-17 RX ORDER — ESOMEPRAZOLE MAGNESIUM 40 MG/1
40 CAPSULE, DELAYED RELEASE ORAL
Qty: 30 CAPSULE | Refills: 11 | Status: SHIPPED | OUTPATIENT
Start: 2025-01-17 | End: 2026-01-17

## 2025-02-15 ENCOUNTER — OFFICE VISIT (OUTPATIENT)
Dept: URGENT CARE | Age: 30
End: 2025-02-15
Payer: COMMERCIAL

## 2025-02-15 VITALS
SYSTOLIC BLOOD PRESSURE: 114 MMHG | DIASTOLIC BLOOD PRESSURE: 80 MMHG | OXYGEN SATURATION: 98 % | HEART RATE: 80 BPM | RESPIRATION RATE: 18 BRPM | TEMPERATURE: 98.1 F

## 2025-02-15 DIAGNOSIS — J02.0 STREP PHARYNGITIS: Primary | ICD-10-CM

## 2025-02-15 RX ORDER — METHYLPREDNISOLONE 4 MG/1
TABLET ORAL
Qty: 21 TABLET | Refills: 0 | Status: SHIPPED | OUTPATIENT
Start: 2025-02-15 | End: 2025-02-21

## 2025-02-15 RX ORDER — AZITHROMYCIN 250 MG/1
TABLET, FILM COATED ORAL
Qty: 6 TABLET | Refills: 0 | Status: SHIPPED | OUTPATIENT
Start: 2025-02-15

## 2025-02-15 NOTE — PROGRESS NOTES
Subjective   Patient ID: Michael Toussaint is a 29 y.o. male. They present today with a chief complaint of Cough (Cough/ sore throat - over a week).    History of Present Illness  30 yo male coming in for cough and sore throat for a week. He states he started off with flu like symptoms and last weekend he was feeling better. He states the cough and sore throat just lingered. He denies any other complaints     Past Medical History  Allergies as of 02/15/2025 - Reviewed 02/15/2025   Allergen Reaction Noted    Sulfa (sulfonamide antibiotics) Other 10/08/2018    Amoxicillin Itching and Rash 11/01/2014       (Not in a hospital admission)       Past Medical History:   Diagnosis Date    GERD (gastroesophageal reflux disease)        Past Surgical History:   Procedure Laterality Date    ESOPHAGOGASTRODUODENOSCOPY      WISDOM TOOTH EXTRACTION          reports that he has never smoked. He has never used smokeless tobacco. He reports current alcohol use of about 2.0 standard drinks of alcohol per week. He reports that he does not use drugs.    Review of Systems  Review of Systems:  General: No weight loss, fatigue, anorexia, insomnia, fever, chills.  ENT: Positive pharyngitis, no dry mouth, nasal congestion, ear pain  Cardiac: No chest pain, palpitations, syncope, near syncope.  Pulmonary:  No shortness of breath, positive cough, no hemoptysis  Heme/lymph: No swollen glands, fever, bleeding  Skin: No rashes  Neuro: No numbness, tingling, headaches                                 Objective    Vitals:    02/15/25 1710   BP: 114/80   Pulse: 80   Resp: 18   Temp: 36.7 °C (98.1 °F)   SpO2: 98%     No LMP for male patient.    Physical Exam  Physical Exam:  General: Vital noted, no distress. Afebrile  EENT: Eyes unremarkable, Pupils PERRLA, EOMs intact. TMs unremarkable. Posterior oropharynx with erythema, edema, and exudate. Uvula in the midline and non-edematous. No PTA. No retropharyngeal mass. No Ramiro's angina.  Cardiac:  Regular rate and rhythm, no murmur  Pulmonary: Lungs clear bilaterally with good aeration. No adventitious breath sounds.      Procedures    Point of Care Test & Imaging Results from this visit  No results found for this visit on 02/15/25.   No results found.    Diagnostic study results (if any) were reviewed by RAMIREZ Doran.    Assessment/Plan   Allergies, medications, history, and pertinent labs/EKGs/Imaging reviewed by RAMIREZ Doran.     Medical Decision Making    Treatment: zithromax and medrol dose pack prescribed  Differential: 1) tonsillitis , 2) pharyngitis , 3) mono   Plan: Patient will follow up with the PCP in the next 2-3 days. Return for any worsening symptoms or go to the ER for further evaluation. Patient understands return precautions and discharge insturctions.  Impression:   1) strep pharyngitis      Orders and Diagnoses  Diagnoses and all orders for this visit:  Strep pharyngitis  -     azithromycin (Zithromax) 250 mg tablet; Take 2 tabs (500 mg) by mouth today, then take 1 tab daily for 4 days.  -     methylPREDNISolone (Medrol Dospak) 4 mg tablets; Follow schedule on package instructions      Medical Admin Record      Patient disposition: Home    Electronically signed by RAMIREZ Doran  5:23 PM

## 2025-02-20 ENCOUNTER — APPOINTMENT (OUTPATIENT)
Dept: PRIMARY CARE | Facility: CLINIC | Age: 30
End: 2025-02-20
Payer: COMMERCIAL